# Patient Record
Sex: MALE | Race: OTHER | NOT HISPANIC OR LATINO
[De-identification: names, ages, dates, MRNs, and addresses within clinical notes are randomized per-mention and may not be internally consistent; named-entity substitution may affect disease eponyms.]

---
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
## 2025-03-06 NOTE — CARE PLAN
[FreeTextEntry2] : Adolescents should do 60 minutes (1 hour) or more of physical activity daily. Most of the 60 or more minutes a day should be either moderate- or vigorous-intensity aerobic physical activity, and should include vigorous-intensity physical activity at least 3 days a week. They should include muscle-strengthening physical activity, as well as bone-strengthening physical activity. It is important to encourage young people to participate in physical activities that are appropriate for their age, that are enjoyable, and that offer variety. Educational material relating to physical activity was provided to the patient.
consented to the use of AI, including the recording.